# Patient Record
(demographics unavailable — no encounter records)

---

## 2017-08-24 RX ORDER — CYCLOBENZAPRINE HCL 10 MG
10 TABLET ORAL
Qty: 90 TAB | Refills: 1 | Status: SHIPPED | OUTPATIENT
Start: 2017-08-24

## 2017-08-24 NOTE — TELEPHONE ENCOUNTER
Last Visit: 09/14/2016 with MD Tapia January    Next Appointment: referred to spine  Previous Refill Encounters: 08/23/2016 per MD Tapia January #90 with 1 refill     Requested Prescriptions     Pending Prescriptions Disp Refills    cyclobenzaprine (FLEXERIL) 10 mg tablet 90 Tab 1     Sig: Take 1 Tab by mouth three (3) times daily as needed for Muscle Spasm(s).